# Patient Record
Sex: FEMALE | Race: OTHER | NOT HISPANIC OR LATINO
[De-identification: names, ages, dates, MRNs, and addresses within clinical notes are randomized per-mention and may not be internally consistent; named-entity substitution may affect disease eponyms.]

---
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
## 2018-04-11 ENCOUNTER — EMERGENCY (EMERGENCY)
Facility: HOSPITAL | Age: 18
LOS: 1 days | Discharge: HOME | End: 2018-04-11
Attending: EMERGENCY MEDICINE | Admitting: EMERGENCY MEDICINE
Payer: SELF-PAY

## 2018-04-11 VITALS
RESPIRATION RATE: 16 BRPM | OXYGEN SATURATION: 100 % | DIASTOLIC BLOOD PRESSURE: 86 MMHG | SYSTOLIC BLOOD PRESSURE: 128 MMHG | HEART RATE: 130 BPM

## 2018-04-11 VITALS
TEMPERATURE: 97 F | RESPIRATION RATE: 18 BRPM | HEART RATE: 107 BPM | OXYGEN SATURATION: 100 % | DIASTOLIC BLOOD PRESSURE: 85 MMHG | SYSTOLIC BLOOD PRESSURE: 125 MMHG

## 2018-04-11 DIAGNOSIS — F19.94 OTHER PSYCHOACTIVE SUBSTANCE USE, UNSPECIFIED WITH PSYCHOACTIVE SUBSTANCE-INDUCED MOOD DISORDER: ICD-10-CM

## 2018-04-11 DIAGNOSIS — F10.920 ALCOHOL USE, UNSPECIFIED WITH INTOXICATION, UNCOMPLICATED: ICD-10-CM

## 2018-04-11 LAB
ALBUMIN SERPL ELPH-MCNC: 4.8 G/DL — SIGNIFICANT CHANGE UP (ref 3.3–5)
ALP SERPL-CCNC: 82 U/L — SIGNIFICANT CHANGE UP (ref 40–120)
ALT FLD-CCNC: 14 U/L RC — SIGNIFICANT CHANGE UP (ref 10–45)
ANION GAP SERPL CALC-SCNC: 15 MMOL/L — SIGNIFICANT CHANGE UP (ref 5–17)
APAP SERPL-MCNC: <15 UG/ML — SIGNIFICANT CHANGE UP (ref 10–30)
APPEARANCE UR: CLEAR — SIGNIFICANT CHANGE UP
AST SERPL-CCNC: 16 U/L — SIGNIFICANT CHANGE UP (ref 10–40)
BASOPHILS # BLD AUTO: 0 K/UL — SIGNIFICANT CHANGE UP (ref 0–0.2)
BASOPHILS NFR BLD AUTO: 0.5 % — SIGNIFICANT CHANGE UP (ref 0–2)
BILIRUB SERPL-MCNC: 0.2 MG/DL — SIGNIFICANT CHANGE UP (ref 0.2–1.2)
BILIRUB UR-MCNC: NEGATIVE — SIGNIFICANT CHANGE UP
BUN SERPL-MCNC: 6 MG/DL — LOW (ref 7–23)
CALCIUM SERPL-MCNC: 9.3 MG/DL — SIGNIFICANT CHANGE UP (ref 8.4–10.5)
CHLORIDE SERPL-SCNC: 103 MMOL/L — SIGNIFICANT CHANGE UP (ref 96–108)
CO2 SERPL-SCNC: 25 MMOL/L — SIGNIFICANT CHANGE UP (ref 22–31)
COLOR SPEC: SIGNIFICANT CHANGE UP
CREAT SERPL-MCNC: 0.6 MG/DL — SIGNIFICANT CHANGE UP (ref 0.5–1.3)
DIFF PNL FLD: NEGATIVE — SIGNIFICANT CHANGE UP
EOSINOPHIL # BLD AUTO: 0.1 K/UL — SIGNIFICANT CHANGE UP (ref 0–0.5)
EOSINOPHIL NFR BLD AUTO: 0.8 % — SIGNIFICANT CHANGE UP (ref 0–6)
ETHANOL SERPL-MCNC: 244 MG/DL — HIGH (ref 0–10)
GLUCOSE SERPL-MCNC: 116 MG/DL — HIGH (ref 70–99)
GLUCOSE UR QL: NEGATIVE — SIGNIFICANT CHANGE UP
HCG UR QL: NEGATIVE — SIGNIFICANT CHANGE UP
HCT VFR BLD CALC: 43.7 % — SIGNIFICANT CHANGE UP (ref 34.5–45)
HGB BLD-MCNC: 14.9 G/DL — SIGNIFICANT CHANGE UP (ref 11.5–15.5)
KETONES UR-MCNC: NEGATIVE — SIGNIFICANT CHANGE UP
LEUKOCYTE ESTERASE UR-ACNC: NEGATIVE — SIGNIFICANT CHANGE UP
LYMPHOCYTES # BLD AUTO: 3.5 K/UL — HIGH (ref 1–3.3)
LYMPHOCYTES # BLD AUTO: 45.2 % — HIGH (ref 13–44)
MCHC RBC-ENTMCNC: 29.4 PG — SIGNIFICANT CHANGE UP (ref 27–34)
MCHC RBC-ENTMCNC: 34.2 GM/DL — SIGNIFICANT CHANGE UP (ref 32–36)
MCV RBC AUTO: 86 FL — SIGNIFICANT CHANGE UP (ref 80–100)
MONOCYTES # BLD AUTO: 0.8 K/UL — SIGNIFICANT CHANGE UP (ref 0–0.9)
MONOCYTES NFR BLD AUTO: 10 % — SIGNIFICANT CHANGE UP (ref 2–14)
NEUTROPHILS # BLD AUTO: 3.4 K/UL — SIGNIFICANT CHANGE UP (ref 1.8–7.4)
NEUTROPHILS NFR BLD AUTO: 43.5 % — SIGNIFICANT CHANGE UP (ref 43–77)
NITRITE UR-MCNC: NEGATIVE — SIGNIFICANT CHANGE UP
PCP SPEC-MCNC: SIGNIFICANT CHANGE UP
PH UR: 6.5 — SIGNIFICANT CHANGE UP (ref 5–8)
PLATELET # BLD AUTO: 450 K/UL — HIGH (ref 150–400)
POTASSIUM SERPL-MCNC: 4.1 MMOL/L — SIGNIFICANT CHANGE UP (ref 3.5–5.3)
POTASSIUM SERPL-SCNC: 4.1 MMOL/L — SIGNIFICANT CHANGE UP (ref 3.5–5.3)
PROT SERPL-MCNC: 8.3 G/DL — SIGNIFICANT CHANGE UP (ref 6–8.3)
PROT UR-MCNC: SIGNIFICANT CHANGE UP
RBC # BLD: 5.08 M/UL — SIGNIFICANT CHANGE UP (ref 3.8–5.2)
RBC # FLD: 12.8 % — SIGNIFICANT CHANGE UP (ref 10.3–14.5)
SALICYLATES SERPL-MCNC: <2 MG/DL — LOW (ref 15–30)
SODIUM SERPL-SCNC: 143 MMOL/L — SIGNIFICANT CHANGE UP (ref 135–145)
SP GR SPEC: 1.01 — SIGNIFICANT CHANGE UP (ref 1.01–1.02)
UROBILINOGEN FLD QL: NEGATIVE — SIGNIFICANT CHANGE UP
WBC # BLD: 7.8 K/UL — SIGNIFICANT CHANGE UP (ref 3.8–10.5)
WBC # FLD AUTO: 7.8 K/UL — SIGNIFICANT CHANGE UP (ref 3.8–10.5)

## 2018-04-11 PROCEDURE — 80307 DRUG TEST PRSMV CHEM ANLYZR: CPT

## 2018-04-11 PROCEDURE — 85027 COMPLETE CBC AUTOMATED: CPT

## 2018-04-11 PROCEDURE — 93005 ELECTROCARDIOGRAM TRACING: CPT

## 2018-04-11 PROCEDURE — 81025 URINE PREGNANCY TEST: CPT

## 2018-04-11 PROCEDURE — 99284 EMERGENCY DEPT VISIT MOD MDM: CPT | Mod: 25

## 2018-04-11 PROCEDURE — 81003 URINALYSIS AUTO W/O SCOPE: CPT

## 2018-04-11 PROCEDURE — 93010 ELECTROCARDIOGRAM REPORT: CPT

## 2018-04-11 PROCEDURE — 80053 COMPREHEN METABOLIC PANEL: CPT

## 2018-04-11 PROCEDURE — 99284 EMERGENCY DEPT VISIT MOD MDM: CPT

## 2018-04-11 PROCEDURE — 99285 EMERGENCY DEPT VISIT HI MDM: CPT

## 2018-04-11 NOTE — ED PROVIDER NOTE - MEDICAL DECISION MAKING DETAILS
Lexy Sung MD - Attending Physician: Pt here with ETOH ingestion, found on a ledge with concern for suicide attempt/ideation. Pt currently denies; however, very intoxicated, tearful, pressured and with significant prior psych history. Will check screening labs, await sober and d/w psych after re-eval

## 2018-04-11 NOTE — ED PROVIDER NOTE - OBJECTIVE STATEMENT
Pt brought in by EMS/Police. Found standing on a ledge, they were concerned she would jump. +ETOH.    Pt reports she was with "friends" at a motel all night. +ETOH. She left on her own and then she got lost and could not get back. She tried to call them but they would not answer. Unsure who called police. She notes she lost her phone, her wallet, her engagement ring. She thinks they are with her "friends" at the motel.    She notes no other drug use. Concerned she might be pregnant. Denies any current medical complaints.     Notes prior psych history. Last admitted in September. On a medication "for depression and anxiety that starts with a C." Reports being compliant  Denies current HI/SI. Denies that she would have tried to jump today

## 2018-04-11 NOTE — ED PROVIDER NOTE - PROGRESS NOTE DETAILS
Clinically sober. Eating. Will call Psych. Medically cleared Seen by Psych. Cleared for DC. ROSEMARY Hebert to provide ETOH resources. Awaiting parent to arrive for Dc Mom here. Pt clinically sober, able to ambulate, tolerated po. Will dc.

## 2018-04-11 NOTE — ED PROVIDER NOTE - CARE PLAN
Principal Discharge DX:	Depression, unspecified depression type Principal Discharge DX:	Alcoholic intoxication with complication  Secondary Diagnosis:	Depression, unspecified depression type

## 2018-04-11 NOTE — ED BEHAVIORAL HEALTH ASSESSMENT NOTE - DETAILS
attempted to hang self, but self-aborted, and was subsequently hospitalized at [facility] aware of plan

## 2018-04-11 NOTE — ED BEHAVIORAL HEALTH ASSESSMENT NOTE - REFERRAL / APPOINTMENT DETAILS
pt refusing outpt referrals, but SW to provide pt was alcohol rehab referrals and outpt mental health referrals prior to her d/c home with family

## 2018-04-11 NOTE — ED ADULT NURSE NOTE - OBJECTIVE STATEMENT
Pt is 17 year old female with diagnosis of MDD and anxiety domiciled with parents and 2 younger sisters. Patient was brought to the hospital by police activated by hotel staff where patient was staying with her friends. Patient reported going out to the club with friends, drinking and going to the hotel afterwards. Patient left room and had trouble remembering room number when she returned. As per EMS patient found standing on the second floor balcony intoxicated and they were unclear if she was going to jump. Patient had difficulty following direction from police. Patient brought to ED for safety. Denies current SI/HI at this time. Patient placed on 1:1 for safety.

## 2018-04-11 NOTE — ED BEHAVIORAL HEALTH ASSESSMENT NOTE - SUMMARY
Pt is a 16 y/o single  female with hx of depression and alcohol use disorder, presented with alcohol intoxication as she was allegedly walking the ledge of a hotel balcony last night, with concern for suicidal behaviors. Pt seen, AOA x 3, denies si/hi and admits to daily drinking. she denies wanting to jump of ledge of hotel and states "I feel fine." Pt denies manic or psychotic symptoms and admits to being upset secondary to being unemployed and not going back to school. Pt minimizes her drinking, does not feel she needs help. Pt also refusing to accept mental health outpt referrals at this time. Father called, and feels comfortable taking patient home and does not feel she is a danger to self or others.

## 2018-04-11 NOTE — ED BEHAVIORAL HEALTH ASSESSMENT NOTE - SUICIDE PROTECTIVE FACTORS
Supportive social network or family/Responsibility to family and others/Future oriented/Identifies reasons for living

## 2018-04-11 NOTE — ED BEHAVIORAL HEALTH ASSESSMENT NOTE - HPI (INCLUDE ILLNESS QUALITY, SEVERITY, DURATION, TIMING, CONTEXT, MODIFYING FACTORS, ASSOCIATED SIGNS AND SYMPTOMS)
18 y/o female, domiciled at home with parents, currently unemployed, PPHx of depression with 2 previous failed suicide attempts and self-injurious behaviors (cutting), with no significant PMHx who presented to the ED via EMS/police for alcohol intoxication and possible suicidal behavior. Psychiatry consulted for evaluation. On interview, patient is sitting upright in bed and is alert, fully oriented, and calm. The patient reports that she was with 4 acquaintances at a hotel in [place] last night. She notes that she started drinking Tequila around 10pm, but is unsure of the exact amount. At some point last night, she became upset about “not being a student.” The patient claims that someone called the  because she was standing on the ledge of their balcony, but she denies having any intent to jump while on the ledge. She denies currently feeling depressed or anxious and claims that she has been sleeping well and her appetite is normal. She denies any current SI/HI, verona, paranoia, or visual/auditory hallucinations. The patient has a reported hx of depression and claims that she takes an unknown dose of Celexa “only when she feels bad.” She has also taken an unknown medication to help with sleep in the past. The patient also endorses consuming a large amount of alcohol most days. She is unsure of the exact amount, but claims that she “often drinks until she passes out.”  Spoke with the patient’s father Hung for collateral. The patient’s father is aware of the patient’s current situation. He does not believe that the patient is a danger to herself when sober. Pt is a 16 y/o  female, domiciled at home with parents, currently unemployed, PPHx of depression with 2 previous inpatient psychiatric admissions, with no significant PMHx who presented to the ED via EMS/police for alcohol intoxication and possible suicidal behavior last night. Psychiatry consulted for evaluation. On interview, patient is sitting upright in bed and is alert, fully oriented, and calm. The patient reports that she was with 4 acquaintances at a hotel in [place] last night. She notes that she started drinking Tequila around 10pm, but is unsure of the exact amount. At some point last night, she became upset about “not being a student.” The patient claims that someone called the  because she was standing on the ledge of their balcony, but she denies having any intent to jump while on the ledge. She denies currently feeling depressed or anxious and claims that she has been sleeping well and her appetite is normal. She denies any current SI/HI, veroan, paranoia, or visual/auditory hallucinations. The patient has a reported hx of depression and claims that she takes an unknown dose of Celexa “only when she feels bad.” She has also taken an unknown medication to help with sleep in the past. The patient also endorses consuming a large amount of alcohol most days. She is unsure of the exact amount, but claims that she “often drinks until she passes out.” Pt does not feel this is a problem for her. Pt also denies any other past substance abuse problems.   Spoke with the patient’s father Hung for collateral. The patient’s father is aware of the patient’s current situation. He does not believe that the patient is a danger to herself when sober. He feels comfortable with patient going home.

## 2020-08-16 NOTE — ED BEHAVIORAL HEALTH ASSESSMENT NOTE - NS ED [facility] MED ROS HEMATOLOGIC LYMPHATIC
Patient Education     Bladder Infection, Female (Adult)    Urine is normally doesn't have any bacteria in it. But bacteria can get into the urinary tract from the skin around the rectum. Or they can travel in the blood from elsewhere in the body. Once they are in your urinary tract, they can cause infection in the urethra (urethritis), the bladder (cystitis), or the kidneys (pyelonephritis).  The most common place for an infection is in the bladder. This is called a bladder infection. This is one of the most common infections in women. Most bladder infections are easily treated. They are not serious unless the infection spreads to the kidney.  The phrases \"bladder infection,\" \"UTI,\" and \"cystitis\" are often used to describe the same thing. But they are not always the same. Cystitis is an inflammation of the bladder. The most common cause of cystitis is an infection.  Symptoms  The infection causes inflammation in the urethra and bladder. This causes many of the symptoms. The most common symptoms of a bladder infection are:  · Pain or burning when urinating  · Having to urinate more often than usual  · Urgent need to urinate  · Only a small amount of urine comes out  · Blood in urine  · Abdominal discomfort. This is usually in the lower abdomen above the pubic bone.  · Cloudy urine  · Strong- or bad-smelling urine  · Unable to urinate (urinary retention)  · Unable to hold urine in (urinary incontinence)  · Fever  · Loss of appetite  · Confusion (in older adults)  Causes  Bladder infections are not contagious. You can't get one from someone else, from a toilet seat, or from sharing a bath.  The most common cause of bladder infections is bacteria from the bowels. The bacteria get onto the skin around the opening of the urethra. From there, they can get into the urine and travel up to the bladder, causing inflammation and infection. This usually happens because of:  · Wiping improperly after urinating. Always wipe  from front to back.  · Bowel incontinence  · Pregnancy  · Procedures such as having a catheter inserted  · Older age  · Not emptying your bladder. This can allow bacteria a chance to grow in your urine.  · Dehydration  · Constipation  · Sex  · Use of a diaphragm for birth control   Treatment  Bladder infections are diagnosed by a urine test. They are treated with antibiotics and usually clear up quickly without complications. Treatment helps prevent a more serious kidney infection.  Medicines  Medicines can help in the treatment of a bladder infection:  · Take antibiotics until they are used up, even if you feel better. It is important to finish them to make sure the infection has cleared.  · You can use acetaminophen or ibuprofen for pain, fever, or discomfort, unless another medicine was prescribed. If you have chronic liver or kidney disease, talk with your healthcare provider before using these medicines. Also talk with your provider if you've ever had a stomach ulcer or gastrointestinal bleeding, or are taking blood-thinner medicines.  · If you are given phenazopydridine to reduce burning with urination, it will cause your urine to become a bright orange color. This can stain clothing.  Care and prevention  These self-care steps can help prevent future infections:  · Drink plenty of fluids to prevent dehydration and flush out your bladder. Do this unless you must restrict fluids for other health reasons, or your doctor told you not to.  · Proper cleaning after going to the bathroom is important. Wipe from front to back after using the toilet to prevent the spread of bacteria.  · Urinate more often. Don't try to hold urine in for a long time.  · Wear loose-fitting clothes and cotton underwear. Avoid tight-fitting pants.  · Improve your diet and prevent constipation. Eat more fresh fruit and vegetables, and fiber, and less junk and fatty foods.  · Avoid sex until your symptoms are gone.  · Avoid caffeine,  alcohol, and spicy foods. These can irritate your bladder.  · Urinate right after intercourse to flush out your bladder.  · If you use birth control pills and have frequent bladder infections, discuss it with your doctor.  Follow-up care  Call your healthcare provider if all symptoms are not gone after 3 days of treatment. This is especially important if you have repeat infections.  If a culture was done, you will be told if your treatment needs to be changed. If directed, you can call to find out the results.  If X-rays were done, you will be told if the results will affect your treatment.  Call 911  Call 911 if any of the following occur:  · Trouble breathing  · Hard to wake up or confusion  · Fainting or loss of consciousness  · Rapid heart rate  When to seek medical advice  Call your healthcare provider right away if any of these occur:  · Fever of 100.4ºF (38.0ºC) or higher, or as directed by your healthcare provider  · Symptoms are not better by the third day of treatment  · Back or belly (abdominal) pain that gets worse  · Repeated vomiting, or unable to keep medicine down  · Weakness or dizziness  · Vaginal discharge  · Pain, redness, or swelling in the outer vaginal area (labia)  Date Last Reviewed: 10/1/2016  © 1445-2733 [employer]. [address] [place] [address], [place] [id number]. All rights reserved. This information is not intended as a substitute for professional medical care. Always follow your healthcare professional's instructions.           Patient Education     Understanding Urinary Tract Infections (UTIs)  Most UTIs are caused by bacteria, although they may also be caused by viruses or fungi. Bacteria from the bowel are the most common source of infection. The infection may start because of any of the following:  · Sexual activity. During sex, bacteria can travel from the penis, vagina, or rectum into the urethra.   · Bacteria on the skin outside the rectum may travel into the urethra.  This is more common in women since the rectum and urethra are closer to each other than in men. Wiping from front to back after using the toilet and keeping the area clean can help prevent germs from getting to the urethra.  · Blockage of urine flow through the urinary tract. If urine sits too long, germs may start to grow out of control.      Parts of the urinary tract  The infection can occur in any part of the urinary tract.  · The kidneys collect and store urine.  · The ureters carry urine from the kidneys to the bladder.  · The bladder holds urine until you are ready to let it out.  · The urethra carries urine from the bladder out of the body. It is shorter in women, so bacteria can move through it more easily. The urethra is longer in men, so a UTI is less likely to reach the bladder or kidneys in men.  Date Last Reviewed: 1/1/2017  © 9382-6590 [employer]. [address] [place] [id number]. All rights reserved. This information is not intended as a substitute for professional medical care. Always follow your healthcare professional's instructions.            No complaints

## 2020-10-31 NOTE — ED PROVIDER NOTE - CPE EDP PSYCH NORM
- - - Physical Exam    Vital Signs: I have reviewed the initial vital signs.  Constitutional: well-nourished, appears stated age, no acute distress  Eyes: Conjunctiva pink, Sclera clear  Cardiovascular: S1 and S2, regular rate, regular rhythm, well-perfused extremities, radial pulses equal and 2+, pedal pulses 2+ and equal   Respiratory: unlabored respiratory effort, clear to auscultation bilaterally no wheezing, rales and rhonchi  Musculoskeletal: supple neck, no lower extremity edema, no midline tenderness, from of right ankle, no specific ttp over right ankle joint.   Integumentary: erythema on dorsum of right foot and right ankle, no streaking.   Neurologic: awake, alert, nvi